# Patient Record
Sex: FEMALE | Race: OTHER | HISPANIC OR LATINO | URBAN - METROPOLITAN AREA
[De-identification: names, ages, dates, MRNs, and addresses within clinical notes are randomized per-mention and may not be internally consistent; named-entity substitution may affect disease eponyms.]

---

## 2024-05-29 ENCOUNTER — EMERGENCY (EMERGENCY)
Facility: HOSPITAL | Age: 44
LOS: 0 days | Discharge: HOME | End: 2024-05-29
Attending: STUDENT IN AN ORGANIZED HEALTH CARE EDUCATION/TRAINING PROGRAM
Payer: COMMERCIAL

## 2024-05-29 VITALS
RESPIRATION RATE: 16 BRPM | SYSTOLIC BLOOD PRESSURE: 117 MMHG | TEMPERATURE: 98 F | OXYGEN SATURATION: 100 % | DIASTOLIC BLOOD PRESSURE: 89 MMHG | HEIGHT: 60 IN | WEIGHT: 141.98 LBS | HEART RATE: 69 BPM

## 2024-05-29 DIAGNOSIS — M54.50 LOW BACK PAIN, UNSPECIFIED: ICD-10-CM

## 2024-05-29 PROCEDURE — 96372 THER/PROPH/DIAG INJ SC/IM: CPT

## 2024-05-29 PROCEDURE — 99283 EMERGENCY DEPT VISIT LOW MDM: CPT | Mod: 25

## 2024-05-29 PROCEDURE — 99284 EMERGENCY DEPT VISIT MOD MDM: CPT

## 2024-05-29 RX ORDER — METHOCARBAMOL 500 MG/1
1500 TABLET, FILM COATED ORAL ONCE
Refills: 0 | Status: COMPLETED | OUTPATIENT
Start: 2024-05-29 | End: 2024-05-29

## 2024-05-29 RX ORDER — KETOROLAC TROMETHAMINE 30 MG/ML
15 SYRINGE (ML) INJECTION ONCE
Refills: 0 | Status: DISCONTINUED | OUTPATIENT
Start: 2024-05-29 | End: 2024-05-29

## 2024-05-29 RX ORDER — METHOCARBAMOL 500 MG/1
2 TABLET, FILM COATED ORAL
Qty: 28 | Refills: 0
Start: 2024-05-29 | End: 2024-06-04

## 2024-05-29 RX ADMIN — METHOCARBAMOL 1500 MILLIGRAM(S): 500 TABLET, FILM COATED ORAL at 10:14

## 2024-05-29 RX ADMIN — Medication 15 MILLIGRAM(S): at 10:15

## 2024-05-29 NOTE — ED PROVIDER NOTE - WET READ LAUNCH FT
Problem: Potential for Falls  Goal: Patient will remain free of falls  Description  INTERVENTIONS:  - Assess patient frequently for physical needs  -  Identify cognitive and physical deficits and behaviors that affect risk of falls    -  [place] fall precautions as indicated by assessment   - Educate patient/family on patient safety including physical limitations  - Instruct patient to call for assistance with activity based on assessment  - Modify environment to reduce risk of injury  - Consider OT/PT consult to assist with strengthening/mobility  Outcome: Progressing There are no Wet Read(s) to document.

## 2024-05-29 NOTE — ED ADULT TRIAGE NOTE - CHIEF COMPLAINT QUOTE
pt c/o pain to her left leg and left arm x5 days, denies injury, reports she "just woke up with it hurting"

## 2024-05-29 NOTE — ED PROVIDER NOTE - NSFOLLOWUPINSTRUCTIONS_ED_ALL_ED_FT
POR FAVOR SIGA CON PAZ DOCTOR 24 A 48 HORAS DESPUES DE ESTA VISITA.     El dolor de espalda es común. Puede ser causada por muchas afecciones, luna la artritis o la rotura de los discos intervertebrales. Paz riesgo de sufrir dolor de espalda aumenta con las lesiones, la falta de actividad o las flexiones y torsiones repetidas. Es posible que sienta dolor o rigidez en marta o ambos lados de la espalda. El dolor puede extenderse a las nalgas o los muslos.  INSTRUCCIONES DE DESCARGA: Regrese al departamento de emergencias si: Tiene dolor, entumecimiento o debilidad en christos o ambas piernas. Paz dolor se vuelve tan intenso que no puede caminar. No puede controlar paz orina o deposiciones. Tiene dolor de espalda intenso con dolor en el pecho. Tiene dolor de espalda intenso, náuseas y vómitos. Tiene dolor de espalda intenso que se extiende al costado o al área genital. Comuníquese con paz proveedor de atención médica si: Tiene dolor de espalda que no mejora con reposo y analgésicos. Tienes fiebre. Tiene dolor que empeora cuando está boca arriba o cuando descansa. Tiene dolor que empeora al toser o estornudar. Pierdes peso sin intentarlo. Tiene preguntas o inquietudes sobre paz condición o atención. Medicamentos: Los KYLE ayudan a disminuir la hinchazón y el dolor. Trina medicamento está disponible con o sin orden médica. Los KYLE pueden causar sangrado estomacal o problemas renales en determinadas personas. Si derek medicamentos anticoagulantes, pregúntele siempre a paz proveedor de atención médica si los KYLE son seguros para usted. Lavern siempre la etiqueta del medicamento y siga las instrucciones. El paracetamol disminuye el dolor y la fiebre. Está disponible sin orden médica. Pregunte cuánto nicole y con qué frecuencia. Seguir direcciones. Lavern las etiquetas de todos los demás medicamentos que esté usando para ah si también contienen acetaminofén o consulte a paz médico o farmacéutico. El paracetamol puede causar daño hepático si no se derek correctamente. No use más de 4 gramos (4000 miligramos) en total de paracetamol en un día. Los relajantes musculares ayudan a disminuir los espasmos musculares y el dolor de espalda. Es posible que le administren analgésicos recetados. Pregúntele a paz proveedor de atención médica cómo nicole trina medicamento de manera perez. Algunos analgésicos recetados contienen paracetamol. No tome otros medicamentos que contengan acetaminofén sin consultar con paz proveedor de atención médica. Demasiado paracetamol puede causar daño hepático. Los analgésicos recetados pueden causar estreñimiento. Pregúntele a paz proveedor de atención médica cómo prevenir o tratar el estreñimiento. [place] paz medicamento según las indicaciones. Comuníquese con paz proveedor de atención médica si joe que paz medicamento no le está ayudando o si tiene efectos secundarios. Dígale si es alérgico a algún medicamento. Mantenga christos lista de los medicamentos, vitaminas y hierbas que derek. Incluya las cantidades y cuándo y por qué las derek. Lleve la lista o los frascos de pastillas a las visitas de seguimiento. Lleve consigo paz lista de medicamentos en aletha de christos emergencia. Cómo controlar paz dolor de espalda: Aplique hielo en la espalda shay 15 a 20 minutos cada hora o según las indicaciones. Utilice christos bolsa de hielo o coloque hielo keily en christos bolsa de plástico. Cúbrelo con christos toalla antes de aplicarlo en tu piel. El hielo ayuda a prevenir el daño a los tejidos y disminuye el dolor. Aplique calor en paz espalda shay 20 a 30 minutos cada 2 horas shay tantos días luna le indiquen. El calor ayuda a disminuir el dolor y los espasmos musculares. Manténgase activo tanto luna pueda sin causar más dolor. El reposo en cama podría empeorar el dolor de espalda. Evite levantar objetos pesados ??hasta que el dolor desaparezca. Vaya a fisioterapia según las indicaciones. Un fisioterapeuta puede enseñarle ejercicios que le ayudarán a mejorar.

## 2024-05-29 NOTE — ED PROVIDER NOTE - PATIENT PORTAL LINK FT
You can access the FollowMyHealth Patient Portal offered by [facility] by registering at the following website: [email or web address]. By joining [facility]’s FollowMyHealth portal, you will also be able to view your health information using other applications (apps) compatible with our system.

## 2024-05-29 NOTE — ED PROVIDER NOTE - OBJECTIVE STATEMENT
43 yr old f w/ no pmh who presents with back pain. Pt reports that for the past five days she has been having L gluteal pain radiating down her L leg. Pt denies any IVDU, fevers, chills, trauma, numbness or any other medical complaints. Of note, pt has had similar episodes in the past. Pt denies any other medical complaints. 43 yr old f w/ no pmh who presents with back pain. Pt reports that for the past five days she has been having L gluteal pain radiating down her L leg. Pt denies any IVDU, fevers, chills, trauma, numbness,urinary/fecal incontinence or any other medical complaints. Of note, pt has had similar episodes in the past. Pt denies any other medical complaints.

## 2024-05-29 NOTE — ED PROVIDER NOTE - CLINICAL SUMMARY MEDICAL DECISION MAKING FREE TEXT BOX
43 yr old f that presents with back pain. neurovascularly intact and hemodynamically stable. Appropriate medications for patient's presenting complaints were ordered and effects were reassessed.  Patient's records (prior hospital, ED visit, and/or nursing home notes if available) were reviewed.  Additional history was obtained from EMS, family, and/or PCP (where available).  Escalation to admission/observation was considered. However patient feels much better and is comfortable with discharge.  Appropriate follow-up was arranged.     I have discussed the discharge plan with the patient. The patient agrees with the plan, as discussed.  The patient understands Emergency Department diagnosis is a preliminary diagnosis often based on limited information and that the patient must adhere to the follow-up plan as discussed.  The patient understands that if the symptoms worsen or if prescribed medications do not have the desired/planned effect that the patient may return to the Emergency Department at any time for further evaluation and treatment.
